# Patient Record
Sex: FEMALE | Race: WHITE | NOT HISPANIC OR LATINO | URBAN - NONMETROPOLITAN AREA
[De-identification: names, ages, dates, MRNs, and addresses within clinical notes are randomized per-mention and may not be internally consistent; named-entity substitution may affect disease eponyms.]

---

## 2019-12-10 ENCOUNTER — IMPORTED ENCOUNTER (OUTPATIENT)
Dept: URBAN - NONMETROPOLITAN AREA CLINIC 1 | Facility: CLINIC | Age: 84
End: 2019-12-10

## 2019-12-10 PROBLEM — Z96.1: Noted: 2019-12-10

## 2019-12-10 PROCEDURE — 92014 COMPRE OPH EXAM EST PT 1/>: CPT

## 2019-12-10 NOTE — PATIENT DISCUSSION
Pseudophakia OU -  discussed findings w/patient-  s/p YAG PC OU open capsules noted-  monitor yearly or prn Hx/o CHRIS -  discussed findings w/patient-  PI 3:00 patent-  monitor yearly or prn; 's Notes: MR defer 12/10/2019DFE 12/10/2019

## 2020-12-11 ENCOUNTER — IMPORTED ENCOUNTER (OUTPATIENT)
Dept: URBAN - NONMETROPOLITAN AREA CLINIC 1 | Facility: CLINIC | Age: 85
End: 2020-12-11

## 2020-12-11 PROCEDURE — 99213 OFFICE O/P EST LOW 20 MIN: CPT

## 2020-12-11 NOTE — PATIENT DISCUSSION
Pseudophakia w/Open Capsules OU-  discussed findings w/patient-  no changes noted at this time-  monitor yearly or prn Hx/o CHRIS -  discussed findings w/patient-  PI 3:00 patent-  monitor yearly or prn; 's Notes: MR gómez 12/11/2020DFE 12/11/2020

## 2022-04-10 ASSESSMENT — TONOMETRY
OS_IOP_MMHG: 15
OS_IOP_MMHG: 18
OD_IOP_MMHG: 18
OD_IOP_MMHG: 16

## 2022-04-10 ASSESSMENT — VISUAL ACUITY
OD_CC: 20/25-
OS_CC: 20/25-